# Patient Record
Sex: MALE | Race: BLACK OR AFRICAN AMERICAN | Employment: PART TIME | URBAN - METROPOLITAN AREA
[De-identification: names, ages, dates, MRNs, and addresses within clinical notes are randomized per-mention and may not be internally consistent; named-entity substitution may affect disease eponyms.]

---

## 2017-08-08 ENCOUNTER — OFFICE VISIT (OUTPATIENT)
Dept: FAMILY MEDICINE CLINIC | Age: 47
End: 2017-08-08

## 2017-08-08 VITALS
HEART RATE: 75 BPM | BODY MASS INDEX: 29.47 KG/M2 | OXYGEN SATURATION: 98 % | HEIGHT: 69 IN | TEMPERATURE: 98 F | WEIGHT: 199 LBS | SYSTOLIC BLOOD PRESSURE: 120 MMHG | RESPIRATION RATE: 16 BRPM | DIASTOLIC BLOOD PRESSURE: 77 MMHG

## 2017-08-08 DIAGNOSIS — M25.511 RIGHT SHOULDER PAIN, UNSPECIFIED CHRONICITY: Primary | ICD-10-CM

## 2017-08-08 NOTE — PROGRESS NOTES
I have reviewed discharge instructions with the patient. Patient given and reviewed  [facility] financial aide screening process. The instructions  Were included in  his AVS .    Patient  told to call [facility] as soon as he is accepted into the [facility] program so that a referral can be placed. Patient given AVS       The patient verbalized understanding.

## 2017-08-08 NOTE — PROGRESS NOTES
HISTORY OF PRESENT ILLNESS  Pepito Mireles is a 56 y.o. male. Shoulder Pain    The history is provided by the patient. Incident onset: Presents with cc of R shoulder pain. Symptoms worsening over the last 2-3 weeks. No antecedent injury or h/o dislocation. Pain is constant and both posterior and anterior. Aggravated by leaning forward. No relieving factors. Incident location: Notes some tingling in arm when pain is worst.   There was no injury mechanism. The right shoulder is affected. The pain is moderate. There is no history of shoulder injury. There is no history of shoulder surgery. Review of Systems   Constitutional: Negative. Physical Exam   Constitutional: He appears well-developed and well-nourished. HENT:   Head: Normocephalic and atraumatic. Musculoskeletal:   R shoulder: TTP over posterior shoulder and along anterior deltoid. No bony deformity. ABD:  180 with pain  FF:  180 w/o pain  Ext rot: 70 (90 on L side)  Int rot: L2 (T 10 on L)  Strength 5/5. Negative impingement sign  + Apprehension sign       ASSESSMENT and PLAN  R shoulder pain: He has some instability on exam.  Some concern for a labral tear. Will start screening process to be seen at [facility].

## 2017-08-08 NOTE — MR AVS SNAPSHOT
Visit Information Date & Time Provider Department Dept. Phone Encounter #  
 8/8/2017  2:00 PM Kalie Rubio MD [id number] [id number] Upcoming Health Maintenance Date Due Pneumococcal 19-64 Medium Risk (1 of 1 - PPSV23) 4/1/1990 DTaP/Tdap/Td series (1 - Tdap) 4/1/1992 INFLUENZA AGE 9 TO ADULT 8/1/2017 Allergies as of 8/8/2017  Review Complete On: 8/8/2017 By: Kalie Rubio MD  
  
 Severity Noted Reaction Type Reactions Keflex [Cephalexin]  09/01/2015   Side Effect Hives Current Immunizations  Never Reviewed No immunizations on file. Not reviewed this visit You Were Diagnosed With   
  
 Codes Comments Right shoulder pain, unspecified chronicity    -  Primary ICD-10-CM: M25.511 ICD-9-CM: 719.41 Vitals BP Pulse Temp Resp Height(growth percentile) Weight(growth percentile) 120/77 (BP 1 Location: Left arm, BP Patient Position: Sitting) 75 98 °F (36.7 °C) (Oral) 16 5' 9\" (1.753 m) 199 lb (90.3 kg) SpO2 BMI Smoking Status 98% 29.39 kg/m2 Current Every Day Smoker Vitals History BMI and BSA Data Body Mass Index Body Surface Area  
 29.39 kg/m 2 2.1 m 2 Preferred Pharmacy Pharmacy Name Phone [facility] [address] [facility], [address] [place] [address] [phone] Your Updated Medication List  
  
   
This list is accurate as of: 8/8/17  2:19 PM.  Always use your most recent med list.  
  
  
  
  
 cyclobenzaprine 10 mg tablet Commonly known as:  FLEXERIL  
TAKE ONE TABLET BY MOUTH THREE TIMES DAILY AS NEEDED FOR MUSCLE SPASM(S)  
  
 naproxen 500 mg tablet Commonly known as:  NAPROSYN Take 1 Tab by mouth two (2) times daily (with meals). raNITIdine 150 mg tablet Commonly known as:  ZANTAC Take 150 mg by mouth two (2) times a day. Patient Instructions You have been referred for specialty care at  [facility] in [place]. Services are provided at a sliding scale rate based on your income. Please call [phone] or [phone] to start the screening process. You can leave them a message with your information, and you will receive a call back. They will only try to contact you two times, so if you miss the call or have not heard from them 2 weeks after your call, please call and leave another message. Once you receive your letter of acceptance, bring it to the [place] [address] and we can schedule your appointment. Introducing [facility]! Kristina Esposito introduces [employer] patient portal. Now you can access parts of your medical record, email your doctor's office, and request medication refills online. 1. In your internet browser, go to [email or web address] 2. Click on the First Time User? Click Here link in the Sign In box. You will see the New Member Sign Up page. 3. Enter your [employer] Access Code exactly as it appears below. You will not need to use this code after youve completed the sign-up process. If you do not sign up before the expiration date, you must request a new code. · [employer] Access Code: [email or web address] Expires: 11/6/2017  2:19 PM 
 
4. Enter the last four digits of your Social Security Number (xxxx) and Date of Birth (mm/dd/yyyy) as indicated and click Submit. You will be taken to the next sign-up page. 5. Create a [employer] ID. This will be your [employer] login ID and cannot be changed, so think of one that is secure and easy to remember. 6. Create a [employer] password. You can change your password at any time. 7. Enter your Password Reset Question and Answer. This can be used at a later time if you forget your password. 8. Enter your e-mail address. You will receive e-mail notification when new information is available in [address] 9. Click Sign Up. You can now view and download portions of your medical record. 10. Click the Download Summary menu link to download a portable copy of your medical information. If you have questions, please visit the Frequently Asked Questions section of the [employer] website. Remember, [employer] is NOT to be used for urgent needs. For medical emergencies, dial 911. Now available from your iPhone and Android! Please provide this summary of care documentation to your next provider. Your primary care clinician is listed as Micah Alvarado. If you have any questions after today's visit, please call [phone].

## 2017-08-08 NOTE — PATIENT INSTRUCTIONS
You have been referred for specialty care at  [address] [place] [address] in [place]. Services are provided at a sliding scale rate based on your income. Please call [phone] or [phone] to start the screening process. You can leave them a message with your information, and you will receive a call back. They will only try to contact you two times, so if you miss the call or have not heard from them 2 weeks after your call, please call and leave another message. Once you receive your letter of acceptance, bring it to the [place] [address] and we can schedule your appointment.

## 2017-08-16 ENCOUNTER — TELEPHONE (OUTPATIENT)
Dept: FAMILY MEDICINE CLINIC | Age: 47
End: 2017-08-16

## 2017-08-16 NOTE — TELEPHONE ENCOUNTER
Patient called and left message for robaxin refill, I see he hasnt been on it in a while please advise.

## 2017-08-22 RX ORDER — METHOCARBAMOL 750 MG/1
750 TABLET, FILM COATED ORAL 3 TIMES DAILY
Qty: 60 TAB | Refills: 3 | Status: SHIPPED | OUTPATIENT
Start: 2017-08-22 | End: 2018-08-28

## 2018-08-28 ENCOUNTER — HOSPITAL ENCOUNTER (OUTPATIENT)
Dept: LAB | Age: 48
Discharge: HOME | End: 2018-08-28

## 2018-08-28 ENCOUNTER — OFFICE VISIT (OUTPATIENT)
Dept: FAMILY MEDICINE CLINIC | Age: 48
End: 2018-08-28

## 2018-08-28 VITALS
WEIGHT: 196 LBS | SYSTOLIC BLOOD PRESSURE: 111 MMHG | DIASTOLIC BLOOD PRESSURE: 73 MMHG | BODY MASS INDEX: 29.03 KG/M2 | OXYGEN SATURATION: 98 % | HEART RATE: 98 BPM | HEIGHT: 69 IN | TEMPERATURE: 97.3 F

## 2018-08-28 DIAGNOSIS — M54.50 CHRONIC LEFT-SIDED LOW BACK PAIN WITHOUT SCIATICA: Primary | ICD-10-CM

## 2018-08-28 DIAGNOSIS — G89.29 CHRONIC LEFT-SIDED LOW BACK PAIN WITHOUT SCIATICA: Primary | ICD-10-CM

## 2018-08-28 LAB
ALBUMIN SERPL-MCNC: 4.4 G/DL (ref 3.4–5)
ALBUMIN/GLOB SERPL: 1.2 {RATIO} (ref 0.8–1.7)
ALP SERPL-CCNC: 114 U/L (ref 45–117)
ALT SERPL-CCNC: 37 U/L (ref 16–61)
ANION GAP SERPL CALC-SCNC: 8 MMOL/L (ref 3–18)
AST SERPL-CCNC: 21 U/L (ref 15–37)
BASOPHILS # BLD: 0.1 K/UL (ref 0–0.1)
BASOPHILS NFR BLD: 1 % (ref 0–2)
BILIRUB SERPL-MCNC: 0.5 MG/DL (ref 0.2–1)
BUN SERPL-MCNC: 8 MG/DL (ref 7–18)
BUN/CREAT SERPL: 7 (ref 12–20)
CALCIUM SERPL-MCNC: 9.2 MG/DL (ref 8.5–10.1)
CHLORIDE SERPL-SCNC: 105 MMOL/L (ref 100–108)
CHOLEST SERPL-MCNC: 217 MG/DL
CO2 SERPL-SCNC: 27 MMOL/L (ref 21–32)
CREAT SERPL-MCNC: 1.14 MG/DL (ref 0.6–1.3)
DIFFERENTIAL METHOD BLD: NORMAL
EOSINOPHIL # BLD: 0.1 K/UL (ref 0–0.4)
EOSINOPHIL NFR BLD: 2 % (ref 0–5)
ERYTHROCYTE [DISTWIDTH] IN BLOOD BY AUTOMATED COUNT: 13.1 % (ref 11.6–14.5)
EST. AVERAGE GLUCOSE BLD GHB EST-MCNC: 117 MG/DL
GLOBULIN SER CALC-MCNC: 3.8 G/DL (ref 2–4)
GLUCOSE SERPL-MCNC: 104 MG/DL (ref 74–99)
HBA1C MFR BLD: 5.7 % (ref 4.2–5.6)
HCT VFR BLD AUTO: 44 % (ref 36–48)
HDLC SERPL-MCNC: 46 MG/DL (ref 40–60)
HDLC SERPL: 4.7 {RATIO} (ref 0–5)
HGB BLD-MCNC: 14.8 G/DL (ref 13–16)
LDLC SERPL CALC-MCNC: 140.4 MG/DL (ref 0–100)
LIPID PROFILE,FLP: ABNORMAL
LYMPHOCYTES # BLD: 2.7 K/UL (ref 0.9–3.6)
LYMPHOCYTES NFR BLD: 49 % (ref 21–52)
MCH RBC QN AUTO: 31.2 PG (ref 24–34)
MCHC RBC AUTO-ENTMCNC: 33.6 G/DL (ref 31–37)
MCV RBC AUTO: 92.6 FL (ref 74–97)
MONOCYTES # BLD: 0.3 K/UL (ref 0.05–1.2)
MONOCYTES NFR BLD: 5 % (ref 3–10)
NEUTS SEG # BLD: 2.3 K/UL (ref 1.8–8)
NEUTS SEG NFR BLD: 43 % (ref 40–73)
PLATELET # BLD AUTO: 228 K/UL (ref 135–420)
PMV BLD AUTO: 11.8 FL (ref 9.2–11.8)
POTASSIUM SERPL-SCNC: 4 MMOL/L (ref 3.5–5.5)
PROT SERPL-MCNC: 8.2 G/DL (ref 6.4–8.2)
RBC # BLD AUTO: 4.75 M/UL (ref 4.7–5.5)
SODIUM SERPL-SCNC: 140 MMOL/L (ref 136–145)
TRIGL SERPL-MCNC: 153 MG/DL (ref ?–150)
TSH SERPL DL<=0.05 MIU/L-ACNC: 1.1 UIU/ML (ref 0.36–3.74)
VLDLC SERPL CALC-MCNC: 30.6 MG/DL
WBC # BLD AUTO: 5.4 K/UL (ref 4.6–13.2)

## 2018-08-28 PROCEDURE — 84443 ASSAY THYROID STIM HORMONE: CPT | Performed by: FAMILY MEDICINE

## 2018-08-28 PROCEDURE — 85025 COMPLETE CBC W/AUTO DIFF WBC: CPT | Performed by: FAMILY MEDICINE

## 2018-08-28 PROCEDURE — 80061 LIPID PANEL: CPT | Performed by: FAMILY MEDICINE

## 2018-08-28 PROCEDURE — 80053 COMPREHEN METABOLIC PANEL: CPT | Performed by: FAMILY MEDICINE

## 2018-08-28 PROCEDURE — 83036 HEMOGLOBIN GLYCOSYLATED A1C: CPT | Performed by: FAMILY MEDICINE

## 2018-08-28 RX ORDER — NAPROXEN 500 MG/1
500 TABLET ORAL 2 TIMES DAILY WITH MEALS
Qty: 60 TAB | Refills: 2 | Status: SHIPPED | OUTPATIENT
Start: 2018-08-28 | End: 2019-03-26 | Stop reason: SDUPTHER

## 2018-08-28 RX ORDER — CYCLOBENZAPRINE HCL 10 MG
10 TABLET ORAL
Qty: 30 TAB | Refills: 1 | Status: SHIPPED | OUTPATIENT
Start: 2018-08-28 | End: 2019-03-26 | Stop reason: SDUPTHER

## 2018-08-28 NOTE — PATIENT INSTRUCTIONS

## 2018-08-28 NOTE — PROGRESS NOTES
You have been referred for specialty care at  [address] [place] [address] in [place]. Services are provided at a sliding scale rate based on your income. Please call [phone] or [phone] to start the screening process. You can leave them a message with your information, and you will receive a call back. They will only try to contact you two times, so if you miss the call or have not heard from them 2 weeks after your call, please call and leave another message. Once you receive your letter of acceptance, bring it to the [place] [address] and we can schedule your appointment.

## 2018-08-29 NOTE — PROGRESS NOTES
HPI  Jordan Lopez is a 53 y.o. male being seen today for   Chief Complaint   Patient presents with    Follow-up     back   . he states that he has left lower back pain for years. In the past had some relief from flexeril and naproxen but it never really went away. Sometimes radiates into buttock. Referred to ortho last visit for shoulder but has not started financial screening process. Past Medical History:   Diagnosis Date    Arthritis 2013    left knee    GERD (gastroesophageal reflux disease) 2012         ROS  Patient states that he is feeling well. Denies complaints of chest pain, shortness of breath, swelling of legs, dizziness or weakness. he denies nausea, vomiting or diarrhea. Current Outpatient Prescriptions   Medication Sig    naproxen (NAPROSYN) 500 mg tablet Take 1 Tab by mouth two (2) times daily (with meals).  cyclobenzaprine (FLEXERIL) 10 mg tablet Take 1 Tab by mouth three (3) times daily as needed for Muscle Spasm(s).  ranitidine (ZANTAC) 150 mg tablet Take 150 mg by mouth two (2) times a day. No current facility-administered medications for this visit. PE  Visit Vitals    /73 (BP 1 Location: Left arm, BP Patient Position: Sitting)    Pulse 98    Temp 97.3 °F (36.3 °C) (Temporal)    Ht 5' 9\" (1.753 m)    Wt 196 lb (88.9 kg)    SpO2 98%    BMI 28.94 kg/m2        Alert and oriented with normal mood and affect. he is well developed and well nourished . Lungs are clear without wheezing. Heart rate is regular without murmurs or gallops. There is no lower extremity edema.      Results for orders placed or performed in visit on 09/08/15   AMB POC URINALYSIS DIP STICK AUTO W/O MICRO   Result Value Ref Range    Color (UA POC) Yellow     Clarity (UA POC) Clear     Glucose (UA POC) Negative Negative    Bilirubin (UA POC) Negative Negative    Ketones (UA POC) Negative Negative    Specific gravity (UA POC) 1.015 1.001 - 1.035    Blood (UA POC) Negative Negative    pH (UA POC) 6.5 4.6 - 8.0    Protein (UA POC) Negative Negative mg/dL    Urobilinogen (UA POC) 0.2 mg/dL 0.2 - 1    Nitrites (UA POC) Negative Negative    Leukocyte esterase (UA POC) Negative Negative   AMB POC GLUCOSE BLOOD, BY GLUCOSE MONITORING DEVICE   Result Value Ref Range    Glucose  mg/dL         Assessment and Plan:        ICD-10-CM ICD-9-CM    1.  Chronic left-sided low back pain without sciatica M54.5 724.2     G89.29 338.29      Start HEP for back  Refill flexeril and naproxen  If not better consider formal PT referral.      Taurus Buck MD

## 2019-03-26 ENCOUNTER — OFFICE VISIT (OUTPATIENT)
Dept: FAMILY MEDICINE CLINIC | Age: 49
End: 2019-03-26

## 2019-03-26 VITALS
HEIGHT: 69 IN | DIASTOLIC BLOOD PRESSURE: 76 MMHG | SYSTOLIC BLOOD PRESSURE: 128 MMHG | BODY MASS INDEX: 29.77 KG/M2 | RESPIRATION RATE: 17 BRPM | TEMPERATURE: 98.1 F | WEIGHT: 201 LBS | OXYGEN SATURATION: 96 % | HEART RATE: 95 BPM

## 2019-03-26 DIAGNOSIS — Z12.11 SCREENING FOR COLON CANCER: ICD-10-CM

## 2019-03-26 DIAGNOSIS — Z00.00 ROUTINE CHECK-UP: Primary | ICD-10-CM

## 2019-03-26 DIAGNOSIS — G89.29 CHRONIC LEFT-SIDED LOW BACK PAIN WITHOUT SCIATICA: ICD-10-CM

## 2019-03-26 DIAGNOSIS — M54.50 CHRONIC LEFT-SIDED LOW BACK PAIN WITHOUT SCIATICA: ICD-10-CM

## 2019-03-26 LAB
BILIRUB UR QL STRIP: NEGATIVE
GLUCOSE UR-MCNC: NEGATIVE MG/DL
KETONES P FAST UR STRIP-MCNC: NEGATIVE MG/DL
PH UR STRIP: 5.5 [PH] (ref 4.6–8)
PROT UR QL STRIP: NEGATIVE
SP GR UR STRIP: 1 (ref 1–1.03)
UA UROBILINOGEN AMB POC: NORMAL (ref 0.2–1)
URINALYSIS CLARITY POC: CLEAR
URINALYSIS COLOR POC: NORMAL
URINE BLOOD POC: NEGATIVE
URINE LEUKOCYTES POC: NEGATIVE
URINE NITRITES POC: NEGATIVE

## 2019-03-26 RX ORDER — CYCLOBENZAPRINE HCL 10 MG
10 TABLET ORAL
Qty: 30 TAB | Refills: 1 | Status: SHIPPED | OUTPATIENT
Start: 2019-03-26 | End: 2021-02-04

## 2019-03-26 RX ORDER — NAPROXEN 500 MG/1
500 TABLET ORAL 2 TIMES DAILY WITH MEALS
Qty: 60 TAB | Refills: 2 | Status: SHIPPED | OUTPATIENT
Start: 2019-03-26 | End: 2021-02-04

## 2019-03-26 NOTE — PROGRESS NOTES
HPI 
Charles Moon is a 53 y.o. male being seen today for Chief Complaint Patient presents with  Back Pain Salina Spencer followup for this pt with back pain. he states that he is out of flexeril and naproxen. They help a small amount with back pain but not much He did back exercises we gave him but they did not help much either. Has not applied for medicaid and he thinks he should qualify Past Medical History:  
Diagnosis Date  Arthritis 2013  
 left knee  GERD (gastroesophageal reflux disease) 2012 ROS Patient states that he is feeling well. Denies complaints of chest pain, shortness of breath, swelling of legs, dizziness or weakness. he denies nausea, vomiting or diarrhea. Current Outpatient Medications Medication Sig  cyclobenzaprine (FLEXERIL) 10 mg tablet Take 1 Tab by mouth three (3) times daily as needed for Muscle Spasm(s).  naproxen (NAPROSYN) 500 mg tablet Take 1 Tab by mouth two (2) times daily (with meals).  ranitidine (ZANTAC) 150 mg tablet Take 150 mg by mouth two (2) times a day. No current facility-administered medications for this visit. PE Visit Vitals /76 (BP 1 Location: Left arm, BP Patient Position: Sitting) Pulse 95 Temp 98.1 °F (36.7 °C) (Temporal) Resp 17 Ht 5' 9\" (1.753 m) Wt 201 lb (91.2 kg) SpO2 96% BMI 29.68 kg/m² Alert and oriented with normal mood and affect. he is well developed and well nourished . Assessment and Plan: ICD-10-CM ICD-9-CM 1. Routine check-up Z00.00 V70.0 AMB POC URINALYSIS DIP STICK AUTO W/O MICRO 2. Screening for colon cancer Z12.11 V76.51 OCCULT BLOOD IMMUNOASSAY,DIAGNOSTIC 3. Chronic left-sided low back pain without sciatica M54.5 724.2 G89.29 338.29 Refill naproxen and flexeril Apply medicaid 
followup 2 mos and refer to PT for chronic LBP when medicaid active My Wright MD

## 2019-03-26 NOTE — PROGRESS NOTES
1. Have you been to the ER, urgent care clinic since your last visit? Hospitalized since your last visit? No 
 
2. Have you seen or consulted any other health care providers outside of the [address] [place] [address] since your last visit? Include any pap smears or colon screening.  No

## 2019-03-26 NOTE — PATIENT INSTRUCTIONS
As many of you know, [place] is expanding Medicaid in January of 2019. Many residents who applied and were denied in the past will now qualify for Medicaid. Under the new system, qualification for non-disabled, non-pregnant adults will be based on income level. The income cut off is listed below, but the only way to know if you may qualify is to apply. You can submit an application online at [email or web address].[place].gov, in person at , or by phone at [phone] Income cut off Single person                                     $16.754  or less per year       ($1,397 or less per month) Family of two                                     $22,715 or less per year         ($1,894 or less per month) Family of three                                   $28.677 or less per year         ($2.391 or less per month) Family of four                                     $34,638 or less per year         ($2,887 or less per month)

## 2019-03-27 ENCOUNTER — HOSPITAL ENCOUNTER (OUTPATIENT)
Dept: LAB | Age: 49
Discharge: HOME | End: 2019-03-27

## 2019-03-27 DIAGNOSIS — Z12.11 SCREENING FOR COLON CANCER: ICD-10-CM

## 2019-03-27 PROCEDURE — 82274 ASSAY TEST FOR BLOOD FECAL: CPT

## 2019-04-02 LAB — HEMOCCULT STL QL IA: NEGATIVE

## 2021-02-04 ENCOUNTER — HOSPITAL ENCOUNTER (INPATIENT)
Age: 51
LOS: 1 days | Discharge: HOME | DRG: 816 | End: 2021-02-05
Attending: EMERGENCY MEDICINE | Admitting: HOSPITALIST

## 2021-02-04 ENCOUNTER — APPOINTMENT (OUTPATIENT)
Dept: CT IMAGING | Age: 51
DRG: 816 | End: 2021-02-04
Attending: EMERGENCY MEDICINE

## 2021-02-04 DIAGNOSIS — R59.1 LYMPHADENOPATHY OF HEAD AND NECK: Primary | ICD-10-CM

## 2021-02-04 PROBLEM — R22.1 NECK MASS: Status: ACTIVE | Noted: 2021-02-04

## 2021-02-04 PROBLEM — K21.9 GERD (GASTROESOPHAGEAL REFLUX DISEASE): Status: ACTIVE | Noted: 2021-02-04

## 2021-02-04 PROBLEM — F17.200 SMOKER: Status: ACTIVE | Noted: 2021-02-04

## 2021-02-04 LAB
ANION GAP SERPL CALC-SCNC: 5 MMOL/L (ref 3–18)
BASOPHILS # BLD: 0.1 K/UL (ref 0–0.1)
BASOPHILS NFR BLD: 1 % (ref 0–2)
BUN SERPL-MCNC: 6 MG/DL (ref 7–18)
BUN/CREAT SERPL: 5 (ref 12–20)
CALCIUM SERPL-MCNC: 9.3 MG/DL (ref 8.5–10.1)
CHLORIDE SERPL-SCNC: 108 MMOL/L (ref 100–111)
CO2 SERPL-SCNC: 27 MMOL/L (ref 21–32)
CREAT SERPL-MCNC: 1.19 MG/DL (ref 0.6–1.3)
DIFFERENTIAL METHOD BLD: ABNORMAL
EOSINOPHIL # BLD: 0.2 K/UL (ref 0–0.4)
EOSINOPHIL NFR BLD: 3 % (ref 0–5)
ERYTHROCYTE [DISTWIDTH] IN BLOOD BY AUTOMATED COUNT: 12.4 % (ref 11.6–14.5)
GLUCOSE SERPL-MCNC: 112 MG/DL (ref 74–99)
HCT VFR BLD AUTO: 44.2 % (ref 36–48)
HGB BLD-MCNC: 15.3 G/DL (ref 13–16)
LDH SERPL L TO P-CCNC: 197 U/L (ref 81–234)
LYMPHOCYTES # BLD: 2.5 K/UL (ref 0.9–3.6)
LYMPHOCYTES NFR BLD: 52 % (ref 21–52)
MCH RBC QN AUTO: 32 PG (ref 24–34)
MCHC RBC AUTO-ENTMCNC: 34.6 G/DL (ref 31–37)
MCV RBC AUTO: 92.5 FL (ref 74–97)
MONOCYTES # BLD: 0.4 K/UL (ref 0.05–1.2)
MONOCYTES NFR BLD: 8 % (ref 3–10)
NEUTS SEG # BLD: 1.7 K/UL (ref 1.8–8)
NEUTS SEG NFR BLD: 36 % (ref 40–73)
PLATELET # BLD AUTO: 242 K/UL (ref 135–420)
PMV BLD AUTO: 10.6 FL (ref 9.2–11.8)
POTASSIUM SERPL-SCNC: 3.8 MMOL/L (ref 3.5–5.5)
RBC # BLD AUTO: 4.78 M/UL (ref 4.7–5.5)
SODIUM SERPL-SCNC: 140 MMOL/L (ref 136–145)
WBC # BLD AUTO: 4.8 K/UL (ref 4.6–13.2)

## 2021-02-04 PROCEDURE — 82784 ASSAY IGA/IGD/IGG/IGM EACH: CPT

## 2021-02-04 PROCEDURE — 99285 EMERGENCY DEPT VISIT HI MDM: CPT

## 2021-02-04 PROCEDURE — 74011250637 HC RX REV CODE- 250/637: Performed by: HOSPITALIST

## 2021-02-04 PROCEDURE — 84155 ASSAY OF PROTEIN SERUM: CPT

## 2021-02-04 PROCEDURE — 74011250636 HC RX REV CODE- 250/636: Performed by: HOSPITALIST

## 2021-02-04 PROCEDURE — 70491 CT SOFT TISSUE NECK W/DYE: CPT

## 2021-02-04 PROCEDURE — 65270000029 HC RM PRIVATE

## 2021-02-04 PROCEDURE — 74011000636 HC RX REV CODE- 636: Performed by: EMERGENCY MEDICINE

## 2021-02-04 PROCEDURE — 85025 COMPLETE CBC W/AUTO DIFF WBC: CPT

## 2021-02-04 PROCEDURE — 80048 BASIC METABOLIC PNL TOTAL CA: CPT

## 2021-02-04 PROCEDURE — 83615 LACTATE (LD) (LDH) ENZYME: CPT

## 2021-02-04 RX ORDER — SODIUM CHLORIDE 0.9 % (FLUSH) 0.9 %
5-40 SYRINGE (ML) INJECTION EVERY 8 HOURS
Status: DISCONTINUED | OUTPATIENT
Start: 2021-02-04 | End: 2021-02-05 | Stop reason: HOSPADM

## 2021-02-04 RX ORDER — ACETAMINOPHEN 500 MG
500 TABLET ORAL
COMMUNITY

## 2021-02-04 RX ORDER — FACIAL-BODY WIPES
10 EACH TOPICAL DAILY PRN
Status: DISCONTINUED | OUTPATIENT
Start: 2021-02-04 | End: 2021-02-05 | Stop reason: HOSPADM

## 2021-02-04 RX ORDER — SODIUM CHLORIDE 0.9 % (FLUSH) 0.9 %
5-40 SYRINGE (ML) INJECTION AS NEEDED
Status: DISCONTINUED | OUTPATIENT
Start: 2021-02-04 | End: 2021-02-05 | Stop reason: HOSPADM

## 2021-02-04 RX ORDER — HYDROGEN PEROXIDE 3 %
20 SOLUTION, NON-ORAL MISCELLANEOUS DAILY
COMMUNITY

## 2021-02-04 RX ORDER — ACETAMINOPHEN 325 MG/1
650 TABLET ORAL
Status: DISCONTINUED | OUTPATIENT
Start: 2021-02-04 | End: 2021-02-05 | Stop reason: HOSPADM

## 2021-02-04 RX ORDER — ENOXAPARIN SODIUM 100 MG/ML
40 INJECTION SUBCUTANEOUS DAILY
Status: DISCONTINUED | OUTPATIENT
Start: 2021-02-05 | End: 2021-02-05 | Stop reason: HOSPADM

## 2021-02-04 RX ORDER — PROMETHAZINE HYDROCHLORIDE 25 MG/1
12.5 TABLET ORAL
Status: DISCONTINUED | OUTPATIENT
Start: 2021-02-04 | End: 2021-02-05 | Stop reason: HOSPADM

## 2021-02-04 RX ORDER — IBUPROFEN 200 MG
1 TABLET ORAL DAILY
Status: DISCONTINUED | OUTPATIENT
Start: 2021-02-05 | End: 2021-02-05 | Stop reason: HOSPADM

## 2021-02-04 RX ORDER — ACETAMINOPHEN 650 MG/1
650 SUPPOSITORY RECTAL
Status: DISCONTINUED | OUTPATIENT
Start: 2021-02-04 | End: 2021-02-05 | Stop reason: HOSPADM

## 2021-02-04 RX ORDER — ONDANSETRON 2 MG/ML
4 INJECTION INTRAMUSCULAR; INTRAVENOUS
Status: DISCONTINUED | OUTPATIENT
Start: 2021-02-04 | End: 2021-02-05 | Stop reason: HOSPADM

## 2021-02-04 RX ORDER — FAMOTIDINE 20 MG/1
20 TABLET, FILM COATED ORAL 2 TIMES DAILY
Status: DISCONTINUED | OUTPATIENT
Start: 2021-02-04 | End: 2021-02-05 | Stop reason: HOSPADM

## 2021-02-04 RX ORDER — SODIUM CHLORIDE 9 MG/ML
75 INJECTION, SOLUTION INTRAVENOUS CONTINUOUS
Status: DISPENSED | OUTPATIENT
Start: 2021-02-04 | End: 2021-02-05

## 2021-02-04 RX ADMIN — SODIUM CHLORIDE 75 ML/HR: 900 INJECTION, SOLUTION INTRAVENOUS at 17:48

## 2021-02-04 RX ADMIN — FAMOTIDINE 20 MG: 20 TABLET, FILM COATED ORAL at 20:40

## 2021-02-04 RX ADMIN — IOPAMIDOL 100 ML: 612 INJECTION, SOLUTION INTRAVENOUS at 16:02

## 2021-02-04 NOTE — H&P
History & Physical    Patient: Baljinder Marroquin MRN: [id number]  CSN: [id number]    YOB: 1970  Age: 53 y.o. Sex: male      DOA: 2/4/2021  Primary Care Provider:  [facility]  Date Reviewed: 8/8/2017          Codes Class Noted POA    * (Principal) Lymphadenopathy ICD-10-CM: R59.1  ICD-9-CM: 785.6  2/4/2021 Unknown        GERD (gastroesophageal reflux disease) ICD-10-CM: K21.9  ICD-9-CM: 530.81  2/4/2021 Unknown        Smoker ICD-10-CM: F17.200  ICD-9-CM: 305.1  2/4/2021 Unknown        Neck mass ICD-10-CM: R22.1  ICD-9-CM: 784.2  2/4/2021 Unknown              Admit to medical     Neck pain/mass, lymphadenopathy  We will have CT chest/abdomen pelvic  Oncology was consulted  Biopsy per surgery  Tylenol as needed for pain    GERD  PPI    Smoker  Nicotine patch    Estimate  length of stay : 1-2 days  DVT : Lovenox ppi proph  CC: Neck pain       HPI:     Baljinder Marroquin is a 53 y.o. male with smoker, reflux disease, chronic back pain came to ER due to neck pain  for 1 week. He noted on lump on left side of neck, painful on palpation. Denies any fever/chills /weight loss/ night sweats. CT neck Multiple homogeneously enhancing enlarged left upper cervical chain lymph nodes. metastatic disease vs lymphoma are suspected. Oncologist recommend ct and biopsy. Denies fhx of cancer. Denies any slurred speech/headache/cp/n/v/blurred vission/d/c/palpitation/gait change/bleeding. He smokes, not drinking alcohol. Wife was at the bedside and they agree with staying in the hospital.     Denies any covid 19 exposure.      .   Visit Vitals  /75   Pulse (!) 111   Temp 97.1 °F (36.2 °C)   Resp 16   Ht 5' 10\" (1.778 m)   Wt 92.5 kg (204 lb)   SpO2 99%   BMI 29.27 kg/m²      O2 Device: Room air      Past Medical History:   Diagnosis Date    Arthritis 2013    left knee    GERD (gastroesophageal reflux disease) 2012       Past Surgical History:   Procedure Laterality Date    HX OTHER SURGICAL  2012    Fistula    HX OTHER SURGICAL      fx mandible repair       Family History   Problem Relation Age of Onset    Diabetes Mother     Hypertension Mother     Heart Disease Father     COPD Father        Social History     Socioeconomic History    Marital status:      Spouse name: Not on file    Number of children: Not on file    Years of education: Not on file    Highest education level: Not on file   Tobacco Use    Smoking status: Current Every Day Smoker     Packs/day: 0.50     Types: Cigarettes    Smokeless tobacco: Never Used   Substance and Sexual Activity    Alcohol use: No     Alcohol/week: 0.0 standard drinks    Drug use: No       Prior to Admission medications    Medication Sig Start Date End Date Taking? Authorizing Provider   esomeprazole (NexIUM) 20 mg capsule Take 20 mg by mouth daily. Yes Other, MD Hans   acetaminophen (Tylenol Extra Strength) 500 mg tablet Take 500 mg by mouth every six (6) hours as needed for Pain. Yes Other, MD Hans       Allergies   Allergen Reactions    Keflex [Cephalexin] Hives       Review of Systems  Gen: No fever, chills, malaise, weight loss/gain. Heent: No headache, rhinorrhea, epistaxis, ear pain, hearing loss, sinus pain, + neck pain and swelling , no sore throat. Heart: No chest pain, palpitations, DEWITT, pnd, or orthopnea. Resp: No cough, hemoptysis, wheezing and shortness of breath. GI: No nausea, vomiting, diarrhea, constipation, melena or hematochezia. : No urinary obstruction, dysuria or hematuria. Derm: No rash, new skin lesion or pruritis. Musc/skeletal: no bone or joint complains. Vasc: No edema, cyanosis or claudication. Endo: No heat/cold intolerance, no polyuria,polydipsia or polyphagia. Neuro: No unilateral weakness, numbness, tingling. No seizures. Heme: No easy bruising or bleeding.           Physical Exam:     Physical Exam:  Visit Vitals  /75   Pulse (!) 111   Temp 97.1 °F (36.2 °C) Resp 16   Ht 5' 10\" (1.778 m)   Wt 92.5 kg (204 lb)   SpO2 99%   BMI 29.27 kg/m²      O2 Device: Room air    Temp (24hrs), Av.1 °F (36.2 °C), Min:97.1 °F (36.2 °C), Max:97.1 °F (36.2 °C)    No intake/output data recorded. No intake/output data recorded. General:  Awake, cooperative, no distress. Head:  Normocephalic, without obvious abnormality, atraumatic. Eyes:  Conjunctivae/corneas clear, sclera anicteric, PERRL, EOMs intact. Nose: Nares normal. No drainage or sinus tenderness. Throat: Lips, mucosa, and tongue normal. .   Neck: Supple, symmetrical, trachea midline, +adenopathy. Palpable, hard non movable mass on left neck        Lungs:   Clear to auscultation bilaterally. Heart:  Regular rate and rhythm, S1, S2 normal, no murmur, click, rub or gallop. Abdomen: Soft, non-tender. Bowel sounds normal. No masses,  No organomegaly. Extremities: Extremities normal, atraumatic, no cyanosis or edema. Pulses: 2+ and symmetric all extremities. Skin: Skin color-pink, texture, turgor normal. No rashes or lesions. Capillary refill normal    Neurologic: CNII-XII intact. No focal motor or sensory deficit.        Labs Reviewed:    BMP:   Lab Results   Component Value Date/Time     2021 02:50 PM    K 3.8 2021 02:50 PM     2021 02:50 PM    CO2 27 2021 02:50 PM    AGAP 5 2021 02:50 PM     (H) 2021 02:50 PM    BUN 6 (L) 2021 02:50 PM    CREA 1.19 2021 02:50 PM    GFRAA >60 2021 02:50 PM    GFRNA >60 2021 02:50 PM     CMP:   Lab Results   Component Value Date/Time     2021 02:50 PM    K 3.8 2021 02:50 PM     2021 02:50 PM    CO2 27 2021 02:50 PM    AGAP 5 2021 02:50 PM     (H) 2021 02:50 PM    BUN 6 (L) 2021 02:50 PM    CREA 1.19 2021 02:50 PM    GFRAA >60 2021 02:50 PM    GFRNA >60 2021 02:50 PM    CA 9.3 2021 02:50 PM     CBC:   Lab Results   Component Value Date/Time    WBC 4.8 02/04/2021 02:50 PM    HGB 15.3 02/04/2021 02:50 PM    HCT 44.2 02/04/2021 02:50 PM     02/04/2021 02:50 PM     All Cardiac Markers in the last 24 hours: No results found for: CPK, CK, CKMMB, CKMB, RCK3, CKMBT, CKNDX, CKND1, MERCEDES, TROPT, TROIQ, JUDITH, TROPT, TNIPOC, BNP, BNPP  Recent Glucose Results:   Lab Results   Component Value Date/Time     (H) 02/04/2021 02:50 PM     ABG: No results found for: PH, PHI, PCO2, PCO2I, PO2, PO2I, HCO3, HCO3I, FIO2, FIO2I  COAGS: No results found for: APTT, PTP, INR, INREXT, INREXT  Liver Panel: No results found for: ALB, CBIL, TBIL, TP, GLOB, AGRAT, ASTPOC, ALTPOC, ALT, AP  Pancreatic Markers: No results found for: AMYLPOCT, AML, LIPPOCT, LPSE    Ct Neck Soft Tissue W Cont    Result Date: 2/4/2021  EXAM:  CT of the neck with intravenous contrast. CLINICAL INDICATION/HISTORY: Left neck pain and swelling COMPARISON: None. . TECHNIQUE: Helical axial CT soft tissue neck performed following nonionic IV contrast administration with coronal and sagittal reconstructions obtained. One or more dose reduction techniques were used on this CT: automated exposure control, adjustment of the mAs and/or kVp according to patient's size, and iterative reconstruction techniques. The specific techniques utilized on this CT exam have been documented in the patient's electronic medical record. Digital imaging and communications and medicine (DICOM) format image data are available to nonaffiliated external healthcare facilities or entities on a secure, media free, reciprocally searchable basis with patient authorization for at least a 12 month period after this study. _______________ FINDINGS:      LYMPH NODES:  There are multiple enlarged homogeneously enhancing lymph nodes in the left upper neck, 2.1 cm left level 2A, 2.1 cm just below the left parotid, 1.7 cm left level IIb and slightly more inferiorly 2 cm left level IIb.  All of these measurements are in greatest transverse dimensions on axial imaging, with abnormal rounded appearance of these lymph nodes. Several small additional subcentimeter bilateral cervical chain lymph nodes. 10 mm diameter right paratracheal lymph node at the thoracic inlet. SUPERFICIAL SOFT TISSUES:  Unremarkable. ORAL CAVITY:  Unremarkable. PHARYNX:  Very mildly enlarged symmetric palatine tonsils with no focal mucosal space nodular lesion. LARYNX:  Unremarkable. PAROTID GLANDS:  Unremarkable. SUBMANDIBULAR GLANDS:  Unremarkable. THYROID GLAND:  Unremarkable. INCLUDED PARANASAL SINUSES:  Mild ethmoid sinus mucosal thickening. Mastoids and middle ear spaces clear. MIDDLE EARS:  Unremarkable. INCLUDED ORBITS:  Unremarkable. INCLUDED BRAIN:  Unremarkable. PULMONARY APICES:  Unremarkable. BONES:  Degenerative cervical spondylosis with no high-grade canal stenosis, no acute or aggressive osseous findings. _______________     1. Multiple homogeneously enhancing enlarged left upper cervical chain lymph nodes, level 2. Differential diagnosis includes both inflammatory versus neoplastic etiology lymph nodes, including metastatic disease and less likely lymphoma. No discrete mucosal lesion but suggest endoscopic exam for occult mucosal lesion. 2. Additional ancillary findings as above.      Procedures/imaging: see electronic medical records for all procedures/Xrays and details which were not copied into this note but were reviewed prior to creation of Claire Milner MD, Internal Medicine     CC: UNKNOWN

## 2021-02-04 NOTE — ED TRIAGE NOTES
Pt to ED for left neck swelling/pain x1wk and left lower back pain, pt denies difficulty swallowing/dental issues, fever/chills, N/V/D, CP/SOB.

## 2021-02-04 NOTE — ED PROVIDER NOTES
EMERGENCY DEPARTMENT HISTORY AND PHYSICAL EXAM    Date: 2/4/2021  Patient Name: Eileen Earl    History of Presenting Illness     Chief Complaint   Patient presents with    Neck Pain    Back Pain         History Provided By: Patient    2:27 PM  Eileen Earl is a 53 y.o. male with PMHX of chronic back pain who presents to the emergency department C/O left-sided neck swelling and pain. Patient reports it started about a week ago. He states is becoming progressively larger and more tender. He denies any fever, cough, congestion, sore throat, difficulty swallowing or breathing, chest pain. He does note he has low back pain but it is unchanged from his baseline. No known sick contacts or recent travel. Pain in this area is worse when he palpates, no relieving factors identified. PCP: UNKNOWN    Current Outpatient Medications   Medication Sig Dispense Refill    esomeprazole (NexIUM) 20 mg capsule Take 20 mg by mouth daily.  acetaminophen (Tylenol Extra Strength) 500 mg tablet Take 500 mg by mouth every six (6) hours as needed for Pain. Past History     Past Medical History:  Past Medical History:   Diagnosis Date    Arthritis 2013    left knee    GERD (gastroesophageal reflux disease) 2012       Past Surgical History:  Past Surgical History:   Procedure Laterality Date    HX OTHER SURGICAL  2012    Fistula    HX OTHER SURGICAL      fx mandible repair       Family History:  Family History   Problem Relation Age of Onset    Diabetes Mother     Hypertension Mother     Heart Disease Father     COPD Father        Social History:  Social History     Tobacco Use    Smoking status: Current Every Day Smoker     Packs/day: 0.50     Types: Cigarettes    Smokeless tobacco: Never Used   Substance Use Topics    Alcohol use: No     Alcohol/week: 0.0 standard drinks    Drug use: No       Allergies:   Allergies   Allergen Reactions    Keflex [Cephalexin] Hives         Review of Systems   Review of Systems   Constitutional: Negative for fever. HENT: Negative for congestion, sore throat, trouble swallowing and voice change. Positive neck mass   Respiratory: Negative for shortness of breath. Cardiovascular: Negative for chest pain. Musculoskeletal: Positive for back pain. All other systems reviewed and are negative.         Physical Exam     Vitals:    02/04/21 1427 02/04/21 1632   BP: 122/84 119/80   Pulse: (!) 111    Resp: 16    Temp: 97.1 °F (36.2 °C)    SpO2: 100% 100%   Weight: 92.5 kg (204 lb)    Height: 5' 10\" (1.778 m)      Physical Exam    Nursing notes and vital signs reviewed    Constitutional: Non toxic appearing, moderate distress  Head: Normocephalic, Atraumatic  ENT: Clear posterior oropharynx without erythema, edema, exudate, uvula midline, just inferior to the angle of the mandible on the left there is a large firm mass, approximately 3 cm in diameter that is tender to palpation and mobile  Eyes: EOMI  Neck: Supple  Cardiovascular: Regular rate and rhythm, no murmurs, rubs, or gallops  Chest: Normal work of breathing and chest excursion bilaterally  Lungs: Clear to ausculation bilaterally  Back: No evidence of trauma or deformity  Extremities: No evidence of trauma or deformity  Skin: Warm and dry, normal cap refill  Neuro: Alert and appropriate, CN intact, normal speech, strength and sensation full and symmetric bilaterally, normal gait, normal coordination  Psychiatric: Normal mood and affect      Diagnostic Study Results     Labs -     Recent Results (from the past 12 hour(s))   CBC WITH AUTOMATED DIFF    Collection Time: 02/04/21  2:50 PM   Result Value Ref Range    WBC 4.8 4.6 - 13.2 K/uL    RBC 4.78 4.70 - 5.50 M/uL    HGB 15.3 13.0 - 16.0 g/dL    HCT 44.2 36.0 - 48.0 %    MCV 92.5 74.0 - 97.0 FL    MCH 32.0 24.0 - 34.0 PG    MCHC 34.6 31.0 - 37.0 g/dL    RDW 12.4 11.6 - 14.5 %    PLATELET 096 812 - 403 K/uL    MPV 10.6 9.2 - 11.8 FL    NEUTROPHILS 36 (L) 40 - 73 % LYMPHOCYTES 52 21 - 52 %    MONOCYTES 8 3 - 10 %    EOSINOPHILS 3 0 - 5 %    BASOPHILS 1 0 - 2 %    ABS. NEUTROPHILS 1.7 (L) 1.8 - 8.0 K/UL    ABS. LYMPHOCYTES 2.5 0.9 - 3.6 K/UL    ABS. MONOCYTES 0.4 0.05 - 1.2 K/UL    ABS. EOSINOPHILS 0.2 0.0 - 0.4 K/UL    ABS. BASOPHILS 0.1 0.0 - 0.1 K/UL    DF AUTOMATED     METABOLIC PANEL, BASIC    Collection Time: 02/04/21  2:50 PM   Result Value Ref Range    Sodium 140 136 - 145 mmol/L    Potassium 3.8 3.5 - 5.5 mmol/L    Chloride 108 100 - 111 mmol/L    CO2 27 21 - 32 mmol/L    Anion gap 5 3.0 - 18 mmol/L    Glucose 112 (H) 74 - 99 mg/dL    BUN 6 (L) 7.0 - 18 MG/DL    Creatinine 1.19 0.6 - 1.3 MG/DL    BUN/Creatinine ratio 5 (L) 12 - 20      GFR est AA >60 >60 ml/min/1.73m2    GFR est non-AA >60 >60 ml/min/1.73m2    Calcium 9.3 8.5 - 10.1 MG/DL       Radiologic Studies -   CT NECK SOFT TISSUE W CONT   Final Result      1. Multiple homogeneously enhancing enlarged left upper cervical chain lymph   nodes, level 2. Differential diagnosis includes both inflammatory versus   neoplastic etiology lymph nodes, including metastatic disease and less likely   lymphoma. No discrete mucosal lesion but suggest endoscopic exam for occult   mucosal lesion. 2. Additional ancillary findings as above. CT Results  (Last 48 hours)               02/04/21 1609  CT NECK SOFT TISSUE W CONT Final result    Impression:      1. Multiple homogeneously enhancing enlarged left upper cervical chain lymph   nodes, level 2. Differential diagnosis includes both inflammatory versus   neoplastic etiology lymph nodes, including metastatic disease and less likely   lymphoma. No discrete mucosal lesion but suggest endoscopic exam for occult   mucosal lesion. 2. Additional ancillary findings as above. Narrative:  EXAM:  CT of the neck with intravenous contrast.       CLINICAL INDICATION/HISTORY: Left neck pain and swelling       COMPARISON: None. .       TECHNIQUE: Helical axial CT soft tissue neck performed following nonionic IV   contrast administration with coronal and sagittal reconstructions obtained. One   or more dose reduction techniques were used on this CT: automated exposure   control, adjustment of the mAs and/or kVp according to patient's size, and   iterative reconstruction techniques. The specific techniques utilized on this CT   exam have been documented in the patient's electronic medical record. Digital   imaging and communications and medicine (DICOM) format image data are available   to nonaffiliated external healthcare facilities or entities on a secure, media   free, reciprocally searchable basis with patient authorization for at least a 12   month period after this study. _______________       FINDINGS:             LYMPH NODES:  There are multiple enlarged homogeneously enhancing lymph   nodes in the left upper neck, 2.1 cm left level 2A, 2.1 cm just below the left   parotid, 1.7 cm left level IIb and slightly more inferiorly 2 cm left level IIb. All of these measurements are in greatest transverse dimensions on axial   imaging, with abnormal rounded appearance of these lymph nodes. Several small   additional subcentimeter bilateral cervical chain lymph nodes. 10 mm diameter   right paratracheal lymph node at the thoracic inlet. SUPERFICIAL SOFT TISSUES:  Unremarkable. ORAL CAVITY:  Unremarkable. PHARYNX:  Very mildly enlarged symmetric palatine tonsils with no focal   mucosal space nodular lesion. LARYNX:  Unremarkable. PAROTID GLANDS:  Unremarkable. SUBMANDIBULAR GLANDS:  Unremarkable. THYROID GLAND:  Unremarkable. INCLUDED PARANASAL SINUSES:  Mild ethmoid sinus mucosal thickening. Mastoids and middle ear spaces clear. MIDDLE EARS:  Unremarkable. INCLUDED ORBITS:  Unremarkable. INCLUDED BRAIN:  Unremarkable.             PULMONARY APICES: Unremarkable. BONES:  Degenerative cervical spondylosis with no high-grade canal   stenosis, no acute or aggressive osseous findings. _______________               CXR Results  (Last 48 hours)    None          Medications given in the ED-  Medications   iopamidoL (ISOVUE 300) 61 % contrast injection 100 mL (100 mL IntraVENous Given 2/4/21 1602)         Medical Decision Making   I am the first provider for this patient. I reviewed the vital signs, available nursing notes, past medical history, past surgical history, family history and social history. Vital Signs-Reviewed the patient's vital signs. Pulse Oximetry Analysis - 100% on room air, not hypoxic     Records Reviewed: Nursing Notes    Provider Notes (Medical Decision Making): Colton Mijares is a 53 y.o. male presenting with swelling on the left side of his neck. Swelling is quite pronounced and CT is concerning for possible malignant process. Discussed with oncology who recommend admission for further imaging and biopsy to determine if this is malignancy and to initiate treatment if needed. Discussed with hospitalist and general surgery for further inpatient management. Patient understands and agrees with this plan. Procedures:  Procedures    ED Course:   CONSULT NOTE:   4:41 PM  Dr. Yanni Gay spoke with Timothy Soto   Specialty: Case Management  Discussed pt's hx, disposition, and available diagnostic and imaging results over the telephone. Reviewed care plans. We will continue to assist patient on outpatient basis with getting insurance and follow-up. CONSULT NOTE:   4:49 PM  Dr. Yanni Gay spoke with Dr. Cayla Avery   Specialty: Oncology  Discussed pt's hx, disposition, and available diagnostic and imaging results over the telephone. Reviewed care plans. CT chest/abd/pelvis, admit for biopsy.  Send LDH, SPEP, quantitiative immunoglobulins    CONSULT NOTE:   5:07 PM  Dr. Yanni Gay spoke with Dr. Carlyn Marroquin   Specialty: Hospitalist  Discussed pt's hx, disposition, and available diagnostic and imaging results over the telephone. Reviewed care plans. Accepts to medical.    CONSULT NOTE:   5:11 PM  Dr. Yanni Gay spoke with Dr. Kerry Chavarria   Specialty: General Surgery  Discussed pt's hx, disposition, and available diagnostic and imaging results over the telephone. Reviewed care plans. Will consult regarding biopsy. Diagnosis and Disposition     Critical Care Time: None    Core Measures:  For Hospitalized Patients:    1. Hospitalization Decision Time:  The decision to hospitalize the patient was made by Dr. Yanni Gay at 4:50 PM on 2/4/2021    2. Aspirin: Aspirin was not given because the patient did not present with a stroke at the time of their Emergency Department evaluation    5:08 PM  Patient is being admitted to the hospital by Dr. Carlyn Marroquin. The results of their tests and reasons for their admission have been discussed with them and/or available family. They convey agreement and understanding for the need to be admitted and for their admission diagnosis. CONDITIONS ON ADMISSION:  Sepsis is not present at the time of admission. Deep Vein Thrombosis is not present at the time of admission. Thrombosis is not present at the time of admission. Urinary Tract Infection is not present at the time of admission. Pneumonia is not present at the time of admission. MRSA is not present at the time of admission. Wound infection is not present at the time of admission. Pressure Ulcer is not present at the time of admission. CLINICAL IMPRESSION:    1. Lymphadenopathy of head and neck      _______________________________      Please note that this dictation was completed with [employer], the [employer] voice recognition software. Quite often unanticipated grammatical, syntax, homophones, and other interpretive errors are inadvertently transcribed by the computer software. Please disregard these errors.   Please excuse any errors that have escaped final proofreading.

## 2021-02-04 NOTE — CONSULTS
Consult Note    Patient: Thelma Lai MRN: [id number]  CSN: [id number]    YOB: 1970  Age: 53 y.o. Sex: male    DOA: 2/4/2021 LOS:  LOS: 0 days        Requesting Physician: ED  Reason for Consultation: Lymhadenopthy/neck mass               HPI:     Thelma Lai is a 53 y.o. male who has been seen for neck mass/lymphadenopathy. Past Medical History:   Diagnosis Date    Arthritis 2013    left knee    GERD (gastroesophageal reflux disease) 2012       Past Surgical History:   Procedure Laterality Date    HX OTHER SURGICAL  2012    Fistula    HX OTHER SURGICAL      fx mandible repair       Family History   Problem Relation Age of Onset    Diabetes Mother     Hypertension Mother     Heart Disease Father     COPD Father        Social History     Socioeconomic History    Marital status:      Spouse name: Not on file    Number of children: Not on file    Years of education: Not on file    Highest education level: Not on file   Tobacco Use    Smoking status: Current Every Day Smoker     Packs/day: 0.50     Types: Cigarettes    Smokeless tobacco: Never Used   Substance and Sexual Activity    Alcohol use: No     Alcohol/week: 0.0 standard drinks    Drug use: No       Prior to Admission medications    Medication Sig Start Date End Date Taking? Authorizing Provider   esomeprazole (NexIUM) 20 mg capsule Take 20 mg by mouth daily. Yes Brenda, MD Hans   acetaminophen (Tylenol Extra Strength) 500 mg tablet Take 500 mg by mouth every six (6) hours as needed for Pain. Yes Brenda, MD Hans       Allergies   Allergen Reactions    Keflex [Cephalexin] Hives       Review of Systems  A comprehensive review of systems was negative except for that written in the History of Present Illness.       Physical Exam:      Visit Vitals  /72   Pulse (!) 111   Temp 97.1 °F (36.2 °C)   Resp 16   Ht 5' 10\" (1.778 m)   Wt 92.5 kg (204 lb)   SpO2 100%   BMI 29.27 kg/m²       Physical Exam:  Pertinent items are noted in HPI. Labs Reviewed: All lab results for the last 24 hours reviewed. Assessment/Plan     Principal Problem:    Lymphadenopathy (2/4/2021)    Active Problems:    GERD (gastroesophageal reflux disease) (2/4/2021)      Smoker (2/4/2021)      Neck mass (2/4/2021)        Given findings and description recommend U/S guided needle biopsy to begin with for diagnosis. May need ENT consult.

## 2021-02-04 NOTE — ED NOTES
1st attempt to call report to receiving RN made. Advised Receiving RN busy tasking at time of call and would return call to receiving RN for report.

## 2021-02-04 NOTE — ED NOTES
TRANSFER - OUT REPORT:    Verbal report given to [address] [facility] [employer]  being transferred to  for routine progression of care       Report consisted of patients Situation, Background, Assessment and   Recommendations(SBAR). Information from the following report(s) SBAR, ED Summary and MAR was reviewed with the receiving nurse. Lines:   Peripheral IV 02/04/21 Left; Anterior Antecubital (Active)   Site Assessment Clean, dry, & intact 02/04/21 1450   Phlebitis Assessment 0 02/04/21 1450   Infiltration Assessment 0 02/04/21 1450   Dressing Status Clean, dry, & intact 02/04/21 1450   Dressing Type Tape;Transparent 02/04/21 1450   Hub Color/Line Status Green;Flushed 02/04/21 1450   Action Taken Blood drawn 02/04/21 1450        Opportunity for questions and clarification was provided.       Patient transported with:   ED Tech

## 2021-02-05 ENCOUNTER — APPOINTMENT (OUTPATIENT)
Dept: GENERAL RADIOLOGY | Age: 51
DRG: 816 | End: 2021-02-05
Attending: INTERNAL MEDICINE

## 2021-02-05 VITALS
RESPIRATION RATE: 18 BRPM | OXYGEN SATURATION: 99 % | BODY MASS INDEX: 29.2 KG/M2 | SYSTOLIC BLOOD PRESSURE: 114 MMHG | HEART RATE: 67 BPM | WEIGHT: 204 LBS | HEIGHT: 70 IN | TEMPERATURE: 98.8 F | DIASTOLIC BLOOD PRESSURE: 75 MMHG

## 2021-02-05 PROBLEM — F10.11 HISTORY OF ALCOHOL ABUSE: Status: ACTIVE | Noted: 2021-02-05

## 2021-02-05 PROBLEM — F19.91 HISTORY OF ILLICIT DRUG USE: Status: ACTIVE | Noted: 2021-02-05

## 2021-02-05 LAB
ANION GAP SERPL CALC-SCNC: 7 MMOL/L (ref 3–18)
BASOPHILS # BLD: 0.1 K/UL (ref 0–0.1)
BASOPHILS NFR BLD: 1 % (ref 0–2)
BUN SERPL-MCNC: 6 MG/DL (ref 7–18)
BUN/CREAT SERPL: 6 (ref 12–20)
CALCIUM SERPL-MCNC: 8.8 MG/DL (ref 8.5–10.1)
CHLORIDE SERPL-SCNC: 107 MMOL/L (ref 100–111)
CO2 SERPL-SCNC: 25 MMOL/L (ref 21–32)
CREAT SERPL-MCNC: 1.06 MG/DL (ref 0.6–1.3)
DIFFERENTIAL METHOD BLD: ABNORMAL
EOSINOPHIL # BLD: 0.2 K/UL (ref 0–0.4)
EOSINOPHIL NFR BLD: 3 % (ref 0–5)
ERYTHROCYTE [DISTWIDTH] IN BLOOD BY AUTOMATED COUNT: 12.5 % (ref 11.6–14.5)
GLUCOSE SERPL-MCNC: 122 MG/DL (ref 74–99)
HCT VFR BLD AUTO: 42.3 % (ref 36–48)
HGB BLD-MCNC: 14.1 G/DL (ref 13–16)
LYMPHOCYTES # BLD: 2.4 K/UL (ref 0.9–3.6)
LYMPHOCYTES NFR BLD: 49 % (ref 21–52)
MAGNESIUM SERPL-MCNC: 2.3 MG/DL (ref 1.6–2.6)
MCH RBC QN AUTO: 31 PG (ref 24–34)
MCHC RBC AUTO-ENTMCNC: 33.3 G/DL (ref 31–37)
MCV RBC AUTO: 93 FL (ref 74–97)
MONOCYTES # BLD: 0.3 K/UL (ref 0.05–1.2)
MONOCYTES NFR BLD: 6 % (ref 3–10)
NEUTS SEG # BLD: 2 K/UL (ref 1.8–8)
NEUTS SEG NFR BLD: 41 % (ref 40–73)
PLATELET # BLD AUTO: 223 K/UL (ref 135–420)
PMV BLD AUTO: 10.5 FL (ref 9.2–11.8)
POTASSIUM SERPL-SCNC: 3.9 MMOL/L (ref 3.5–5.5)
RBC # BLD AUTO: 4.55 M/UL (ref 4.7–5.5)
SODIUM SERPL-SCNC: 139 MMOL/L (ref 136–145)
WBC # BLD AUTO: 5 K/UL (ref 4.6–13.2)

## 2021-02-05 PROCEDURE — 71046 X-RAY EXAM CHEST 2 VIEWS: CPT

## 2021-02-05 PROCEDURE — 74011250637 HC RX REV CODE- 250/637: Performed by: HOSPITALIST

## 2021-02-05 PROCEDURE — 36415 COLL VENOUS BLD VENIPUNCTURE: CPT

## 2021-02-05 PROCEDURE — 80048 BASIC METABOLIC PNL TOTAL CA: CPT

## 2021-02-05 PROCEDURE — 83735 ASSAY OF MAGNESIUM: CPT

## 2021-02-05 PROCEDURE — 85025 COMPLETE CBC W/AUTO DIFF WBC: CPT

## 2021-02-05 PROCEDURE — 83883 ASSAY NEPHELOMETRY NOT SPEC: CPT

## 2021-02-05 RX ORDER — CIPROFLOXACIN 2 MG/ML
400 INJECTION, SOLUTION INTRAVENOUS
Status: DISCONTINUED | OUTPATIENT
Start: 2021-02-05 | End: 2021-02-05 | Stop reason: HOSPADM

## 2021-02-05 RX ORDER — SODIUM CHLORIDE, SODIUM LACTATE, POTASSIUM CHLORIDE, CALCIUM CHLORIDE 600; 310; 30; 20 MG/100ML; MG/100ML; MG/100ML; MG/100ML
125 INJECTION, SOLUTION INTRAVENOUS CONTINUOUS
Status: CANCELLED | OUTPATIENT
Start: 2021-02-05

## 2021-02-05 RX ORDER — SODIUM CHLORIDE 0.9 % (FLUSH) 0.9 %
5-40 SYRINGE (ML) INJECTION AS NEEDED
Status: CANCELLED | OUTPATIENT
Start: 2021-02-05

## 2021-02-05 RX ORDER — SODIUM CHLORIDE 0.9 % (FLUSH) 0.9 %
5-40 SYRINGE (ML) INJECTION EVERY 8 HOURS
Status: CANCELLED | OUTPATIENT
Start: 2021-02-05

## 2021-02-05 RX ADMIN — FAMOTIDINE 20 MG: 20 TABLET, FILM COATED ORAL at 10:19

## 2021-02-05 NOTE — ROUTINE PROCESS
Bedside and Verbal shift change report given to Constanza Mcneal RN (oncoming nurse) by JEIMY Navarro RN (offgoing nurse). Report included the following information SBAR, Kardex, OR Summary, Intake/Output, MAR and Recent Results.

## 2021-02-05 NOTE — PROGRESS NOTES
OR calls the floor to inform that: procedure is cancelled by Dr. Bethany Torres because they cannot perform a pathology test.

## 2021-02-05 NOTE — ROUTINE PROCESS
Bedside and Verbal shift change report given to Coral RN (oncoming nurse) by Chaz Gore RN (offgoing nurse). Report included the following information SBAR, Kardex, Intake/Output and MAR.

## 2021-02-05 NOTE — PROGRESS NOTES
Reason for Admission:   C/o left neck swelling/pain along with left lower back pain                   RUR Score:                     Plan for utilizing home health:   TBD       PCP: First and Last name:  Non listed   Name of Practice:    Are you a current patient: Yes/No:    Approximate date of last visit:    Can you participate in a virtual visit with your PCP:                     Current Advanced Directive/Advance Care Plan: not on chart, pastoral  services available if needed                         Transition of Care Plan:   CM was notified by  while pt was seen in the ED for self pay status and follow up care, cm sent E-Mail to [employer] for medicaid screening and financial application, at this time pt has been admitted to medical unit, admitting diagnosis lymphadenopathy of head and neck, oncology has been consulted, unit cm will follow case.

## 2021-02-05 NOTE — DISCHARGE SUMMARY
Discharge Summary    Young Lozada is a 53 y.o. male with smoker, reflux disease, chronic back pain came to ER due to neck pain  for 1 week. He noted on lump on left side of neck, painful on palpation. Denies any fever/chills /weight loss/ night sweats. CT neck Multiple homogeneously enhancing enlarged left upper cervical chain lymph nodes. metastatic disease vs lymphoma are suspected. Oncologist recommend ct and biopsy. Denies fhx of cancer.      Denies any slurred speech/headache/cp/n/v/blurred vission/d/c/palpitation/gait change/bleeding. He smokes, not drinking alcohol. Wife was at the bedside and they agree with staying in the hospital.      Denies any covid 19 exposure. Oncologist/surgeon were consulted.  He left hospital with [facility]

## 2021-02-05 NOTE — PROGRESS NOTES
Cm spoke with pt via phone conversation to discuss d/c planning, when discharged pt plans to return back home with spouse, pt independent, denies any usage of DME's, pt had been active with [address] but now closed due to covid, cms scheduled follow up with [facility] clinic, pt listed as self pay denies having medical insurance, medicaid screening/application completed,biopsy procedure canceled for today pt likely will remain over weekend.

## 2021-02-05 NOTE — PROGRESS NOTES
0759   Pt is awake, alert, oriented x4. Resting in bed. Pt is NPO for CT abdomen/ pelvis and  Bx. Lungs are clear. Abdomen soft with active BS. Pt has a hard mass on left side of neck below jaw line. 200   RN spoke to Dr Shannon Taylor. Pt is going for Cervical lymph node Bx this afternoon. Pt was made aware. Pt brushed his teeth and did oral care. CHG wipes body wash done. 1030   Consent for Cervical lymph bx  siged by pt and witnessed by RN. [id number]   RN spoke to Dr Shannon Taylor regarding  Cancellation of OR Procedure. As per Dr Shannon Taylor, pathologist unable to run  the specimen that oncologist wanted to be done until next week. Procedure will be done as outpatient. Dr Rich Mcneill was made aware. Ordered regular  Diet. Pt wants to go home. Dr Rich Mcneill stated that pt is not for d/c and needs CT of abdomen. 200  Pt was made aware of OR cancellation and  Option for Outpatient procedure. Pt wants to go home. Dr Rich Mcneill was made aware. Dr Rich Mcneill spoke to pt but pt  refusing to stay. Pt still decided to leave the hospital.  Pt signed out AMA. INT removed. 1420   Pt left AMA ambulatory .

## 2021-02-05 NOTE — PROGRESS NOTES
Hospitalist Progress Note-critical care note     Patient: Danni Asif MRN: [id number]  CSN: [id number]    YOB: 1970  Age: 53 y.o. Sex: male    DOA: 2/4/2021 LOS:  LOS: 1 day            Chief complaint: lymphadenopathy, neck mass, smoker , neck mass     Assessment/Plan         Hospital Problems  Date Reviewed: 8/8/2017          Codes Class Noted POA    History of illicit drug use [address]: Z87.898  ICD-9-CM: 305.90  2/5/2021 Unknown        History of alcohol abuse ICD-10-CM: F10.11  ICD-9-CM: 305.03  2/5/2021 Unknown        * (Principal) Lymphadenopathy ICD-10-CM: R59.1  ICD-9-CM: 785.6  2/4/2021 Yes        GERD (gastroesophageal reflux disease) ICD-10-CM: K21.9  ICD-9-CM: 530.81  2/4/2021 Yes        Smoker ICD-10-CM: F17.200  ICD-9-CM: 305.1  2/4/2021 Yes        Neck mass ICD-10-CM: R22.1  ICD-9-CM: 784.2  2/4/2021 Yes              Neck pain/mass, lymphadenopathy  We will have CT chest/abdomen pelvic this afternoon   Oncology was consulted  Biopsy per surgery-hold today-james can not performed   Tylenol as needed for pain  cxr clear      GERD  PPI     Smoker  Nicotine patch       Subjective: feel fine     Disposition :after biopsy   Review of systems:    General: No fevers or chills. Cardiovascular: No chest pain or pressure. No palpitations. Pulmonary: No shortness of breath. Gastrointestinal: No nausea, vomiting. Vital signs/Intake and Output:  Visit Vitals  /75   Pulse 67   Temp 98.8 °F (37.1 °C)   Resp 18   Ht 5' 10\" (1.778 m)   Wt 92.5 kg (204 lb)   SpO2 99%   BMI 29.27 kg/m²     Current Shift:  No intake/output data recorded. Last three shifts:  02/03 1901 - 02/05 0700  In: -   Out: 1 [Urine:475]    Physical Exam:  General: WD, WN. Alert, cooperative, no acute distress    HEENT: NC, Atraumatic. PERRLA, anicteric sclerae. Neck mass same   Lungs: CTA Bilaterally. No Wheezing/Rhonchi/Rales.   Heart:  Regular  rhythm,  No murmur, No Rubs, No Gallops  Abdomen: Soft, Non distended, Non tender. +Bowel sounds,   Extremities: No c/c/e  Psych:   Not anxious or agitated. Neurologic:  No acute neurological deficit. Labs: Results:       Chemistry Recent Labs     02/05/21  0310 02/04/21  1450   * 112*    140   K 3.9 3.8    108   CO2 25 27   BUN 6* 6*   CREA 1.06 1.19   CA 8.8 9.3   AGAP 7 5   BUCR 6* 5*      CBC w/Diff Recent Labs     02/05/21  0310 02/04/21  1450   WBC 5.0 4.8   RBC 4.55* 4.78   HGB 14.1 15.3   HCT 42.3 44.2    242   GRANS 41 36*   LYMPH 49 52   EOS 3 3      Cardiac Enzymes No results for input(s): CPK, CKND1, MERCEDES in the last 72 hours. No lab exists for component: CKRMB, TROIP   Coagulation No results for input(s): PTP, INR, APTT, INREXT in the last 72 hours. Lipid Panel Lab Results   Component Value Date/Time    Cholesterol, total 217 (H) 08/28/2018 02:35 PM    HDL Cholesterol 46 08/28/2018 02:35 PM    LDL, calculated 140.4 (H) 08/28/2018 02:35 PM    VLDL, calculated 30.6 08/28/2018 02:35 PM    Triglyceride 153 (H) 08/28/2018 02:35 PM    CHOL/HDL Ratio 4.7 08/28/2018 02:35 PM      BNP No results for input(s): BNPP in the last 72 hours. Liver Enzymes No results for input(s): TP, ALB, TBIL, AP in the last 72 hours. No lab exists for component: SGOT, GPT, DBIL   Thyroid Studies Lab Results   Component Value Date/Time    TSH 1.10 08/28/2018 02:35 PM        Procedures/imaging: see electronic medical records for all procedures/Xrays and details which were not copied into this note but were reviewed prior to creation of Plan    Xr Chest Pa Lat    Result Date: 2/5/2021  HISTORY: -Provided with order: malignancy -Additional: None Technique: CHEST PA AND LATERAL VIEWS Comparison study:None. FINDINGS: HEART AND MEDIASTINUM: Unremarkable. LUNGS AND PLEURAL SPACES: No plain film evidence of consolidation, congestive heart failure, pleural effusion or pneumothorax. BONY THORAX AND SOFT TISSUES: No acute osseous abnormality.      No acute cardiopulmonary disease is identified by plain films, allowing for technique. Ct Neck Soft Tissue W Cont    Result Date: 2/4/2021  EXAM:  CT of the neck with intravenous contrast. CLINICAL INDICATION/HISTORY: Left neck pain and swelling COMPARISON: None. . TECHNIQUE: Helical axial CT soft tissue neck performed following nonionic IV contrast administration with coronal and sagittal reconstructions obtained. One or more dose reduction techniques were used on this CT: automated exposure control, adjustment of the mAs and/or kVp according to patient's size, and iterative reconstruction techniques. The specific techniques utilized on this CT exam have been documented in the patient's electronic medical record. Digital imaging and communications and medicine (DICOM) format image data are available to nonaffiliated external healthcare facilities or entities on a secure, media free, reciprocally searchable basis with patient authorization for at least a 12 month period after this study. _______________ FINDINGS:      LYMPH NODES:  There are multiple enlarged homogeneously enhancing lymph nodes in the left upper neck, 2.1 cm left level 2A, 2.1 cm just below the left parotid, 1.7 cm left level IIb and slightly more inferiorly 2 cm left level IIb. All of these measurements are in greatest transverse dimensions on axial imaging, with abnormal rounded appearance of these lymph nodes. Several small additional subcentimeter bilateral cervical chain lymph nodes. 10 mm diameter right paratracheal lymph node at the thoracic inlet. SUPERFICIAL SOFT TISSUES:  Unremarkable. ORAL CAVITY:  Unremarkable. PHARYNX:  Very mildly enlarged symmetric palatine tonsils with no focal mucosal space nodular lesion. LARYNX:  Unremarkable. PAROTID GLANDS:  Unremarkable. SUBMANDIBULAR GLANDS:  Unremarkable. THYROID GLAND:  Unremarkable. INCLUDED PARANASAL SINUSES:  Mild ethmoid sinus mucosal thickening. Mastoids and middle ear spaces clear. MIDDLE EARS:  Unremarkable. INCLUDED ORBITS:  Unremarkable. INCLUDED BRAIN:  Unremarkable. PULMONARY APICES:  Unremarkable. BONES:  Degenerative cervical spondylosis with no high-grade canal stenosis, no acute or aggressive osseous findings. _______________     1. Multiple homogeneously enhancing enlarged left upper cervical chain lymph nodes, level 2. Differential diagnosis includes both inflammatory versus neoplastic etiology lymph nodes, including metastatic disease and less likely lymphoma. No discrete mucosal lesion but suggest endoscopic exam for occult mucosal lesion. 2. Additional ancillary findings as above.        Lincoln Henley MD

## 2021-02-05 NOTE — PROGRESS NOTES
[facility] care of patient. Patient alert and oriented x4. Denies any SOB/chest pain. On room air. Last BM this AM. Tender lump noted to left side of neck. Denies any difficulty swallowing. Reports pain 5/10 to left neck. Patient sitting on edge of bed eating dinner. Call light in reach.

## 2021-02-05 NOTE — PROGRESS NOTES
2039-alert and oriented x 4. Up to bathroom independently. Lungs CTA on RA. BS active x 4 quads. IV access to left AC infusing IVF without difficulty. Patient with pain rated at 5/10 to left side of neck, but declines pain medication. Left side of neck swollen. 0443-Dr. Gentile on floor and asked about diet order for this patient. She said she would look into it. 0603-Taken to x-ray via wheel chair. 0612-returned to floor. Shift summary-ambulates in room independently. No complaints through the noc.

## 2021-02-05 NOTE — PROGRESS NOTES
Patient is alert and oriented, and able to clearly report the risks of leaving the hospital w/o further intervention. The risks of leaving the hospital AMA and benefits of further treatment discussed with patient. The patient's decisional capacity is intact and clearly understands. The patient is fully aware of the risks including, but not limited to the following: worsening symptoms, decline in functional status, and even the possiblity of death. The patient declines the recommendations at this time. The patient clearly understands that she/he may return to the ED at any time for further evaluation and treatment. The patient is adamant of leaving the hospital AMA.      Recommend to f/u with Dr. Rustam Carbajal for further work up and treatment

## 2021-02-05 NOTE — CONSULTS
Phone: [phone]  Paging : [phone]      Hematology / Oncology Progress Note    Admit Date: 2021    Assessment:     Principal Problem:    Lymphadenopathy (2021)    Active Problems:    GERD (gastroesophageal reflux disease) (2021)      Smoker (2021)      Neck mass (2021)      History of illicit drug use (5229)      History of alcohol abuse (2021)        Plan:     .  Pending tests include a serum protein electrophoresis with quantitative immunoglobulins. Suggest the followin. Chest x-ray PA and lateral now. With his tracheal note this easily could be a lung process such as lung cancer and a heavy smoker. CTs of chest abdomen pelvis are plan to complete staging. 2.  Fine-needle biopsy is likely not be enough further diagnosis. If this is a lymphoma, will need molecular studies. Would need a much larger piece of tissue than what can be obtained with a needle biopsy. If this is lung cancer now standard of care to do next generation sequencing to determine markers to help with treatment options. Is unlikely we will get enough tissue with a needle biopsy. These consider excisional biopsy of the lymph nodes in the neck for diagnosis. In the fact this patient does not have insurance a large piece of tissue now will allow us to do well the pathology as an outpatient and we can begin treatment as the diagnosis is obtained. Is likely malignant. Line 3. Final treatment recommendations will be made once we have his staging completed well as we have a tissue diagnosis. 4.  If the lymphoma, we send tissue unfixed to pathology for flow cytometry, chromosomal analysis, FISH analysis. If it is a carcinoma, send for PD-L1 and next generation sequencing with Car[facility] testing    Excisional biopsy would be preferred so we have enough tissue for markers    Subjective:   Chaparrita Wilkes is a 64-year-old man who complains of a 7 to 10-day prior to admission.   Of increasing lymphadenopathy in the left neck. This was not associated with a fever, infectious episode, or dental issue. He denies any fever chills night sweats sores chills but noticed that over the last 7 to 10 days the masses in his neck have been getting larger. They are somewhat tender. He denies any hoarseness. He has no teeth and no dental source, no tongue pain no hoarseness, no history of a viral prodrome. No B symptoms. He does have a history of heavy tobacco use having smoked a half a pack per day for 30 years with no real interest in stopping. Previously several years ago he was a heavy alcoholic. He is also used cocaine on the side as well as pot but has not had any illicit drug use in many years. Health maintenance he has not had a flu shot pneumococcal vaccine he did have a colonoscopy about 1 year ago. There was no polyps no blood. He did have chickenpox. The patient came to the emergency room due to the increasing masses in his neck. On examination he had multiple enlarged lymph nodes involving the left neck which were somewhat tender and rubbery in size. There is a conglomeration of nodes in his neck. He had a CT of the neck showing multiple enlarged homogeneously enhancing lymph  nodes in the left upper neck, 2.1 cm left level 2A, 2.1 cm just below the left parotid, 1.7 cm left level IIb and slightly more inferiorly 2 cm left level IIb. All of these measurements are in greatest transverse dimensions on axial  imaging, with abnormal rounded appearance of these lymph nodes. Several small additional subcentimeter bilateral cervical chain lymph nodes. 10 mm diameter right paratracheal lymph node at the thoracic inlet. Was admitted to the hospital for further evaluation and treatment. Laboratory studies show a white count of 5 hemoglobin 14.1 platelets 137,557.   He has a normal differential.  Serum chemistries show a sodium of 139 potassium 3.9 chloride 107 CO2 25 glucose 122 BUN 6 creatinine 1.06 calcium 8.8 magnesium 2.3. Bilirubin 0.5 total protein 8.2 albumin 4.4 ALT 37 AST 21 alkaline phosphatase 114 cholesterol 217 triglycerides 153 collated  hemoglobin A1c 5.7. . Are asked to evaluate him for his lymphadenopathy. No chest x-ray was done.     Past medical history:  History low repair 1990s  Right jaw secondary to trauma. Jaw was wired shut for 3 months and healed. Family history: Mother  of a pulmonary embolism. She was getting chemo and radiation for vulvar carcinoma she also has a history of cholesterol hypertension and diabetes. She  in the emergency room with a cardiopulmonary arrest.  At autopsy they found that she had a pulmonary emboli which was massive probably coming from the pelvis and legs and went to the lungs  Father  of natural causes. He had a history of mesothelioma and asbestosis. Not sure if he  of the mesothelioma or natural causes. 1 brother who was born with transposition of the great vessels. He is still alive and well  2 twin sons age 29. They are healthy. Social history:  The patient is . Tobacco he smokes half a pack per day for 30 years and continues to smoke  Alcohol l: Previous heavy drinker. Patient describes himself as a recovering alcoholic he has not drank for about 9 years  Abuse: The patient is a previous addict. He previously used cocaine marijuana but it has been many years since he was using drugs. Previously HIV negative. 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    Objective:     Patient Vitals for the past 24 hrs:   BP Temp Pulse Resp SpO2 Height Weight   21 0356 109/68 98.8 °F (37.1 °C) 84 16 97 %     21 2009 110/62 98.8 °F (37.1 °C) 91 18 97 %     21 1815     100 %     21 1800 124/72    95 %     21 1700 111/75    99 %     21 1632 119/80    100 %     21 1427 122/84 97.1 °F (36.2 °C) (!) 111 16 100 % 5' 10\" (1.778 m) 92.5 kg (204 lb)         Intake/Output Summary (Last 24 hours) at 2021 9255  Last data filed at 2021 0131  Gross per 24 hour   Intake    Output 475 ml   Net -475 ml       Temp (24hrs), Av.2 °F (36.8 °C), Min:97.1 °F (36.2 °C), Max:98.8 °F (37.1 °C)      Constitutional:     Fever N    Chills N    Weight Loss N    Malaise N    Fatigue N    Diaphoresis N    Weakness N     Skin:    Rash N    Itching N     HENT:     Headache N    Hearing Loss N    Tinnitus N    Ear pain N    Nosebleeds N    Congestion N    Stridor  N    Sore Throat N     Eyes:   Blurred Vision N   Double vision N   Photophobia N   Eye Pain N   Eye Discharge  N   Eye Redness N     Cardiovascular:   Chest Pain N   Palpitations N   Orthopnea N   Claudication N   PND N   Leg Swelling N     Respiratory:    Cough N   Hemoptysis N   Sputum Production N   Shortness of Breath N   Wheezing N     Gastrointestinal:    Heartburn N   Nausea N   Abdominal Pain N   Diarrhea N   Constipation N   Blood in Stool N   Melena N      Genitourinary:   Dysuria N   Urgency N   Frequency N   Hematuria N   Flank Pain N     Musculoskeletal:   Myalgias N   Neck Pain N   Back Pain N   Joint Pain N   Falls N     Endo/Heme/Allergies:   Easy Bruise N   Easy Bleed N   Env. Allergies N   Polydipsia N     Neurological:   Dizziness N   Tingling N   Tremor N   Sensory Change N   Speech Change N   Focal Weakness N   Seizures N   LOC N      Psychiatric:  Depression N   Hallucinations N   Nervous N   Anxious N   Insomnia N   Memory Loss N       Exam:  Is a well-developed male no acute distress  Head normocephalic atraumatic eyes pupils equal round reactive to light and accommodation extraocular muscles intact no jaundice was seen  There was no sinus tenderness or discharge  Oral nasal mucosa shows an edentulous male no masses or lesions noted no thrush is seen  Neck is supple.   There are multiple masses in the left neck from the supraclavicular area all the way up the anterior cervical chain. The largest is a conglomeration of lymph nodes that are matted about 2-1/2 to 3 cm in size. These are all concerning for malignancy  No oral lesions sewn the patient is edentulous  Heart regular rate and rhythm without murmurs rubs or gallops  Lungs clear to all station percussion  Back no CVA or spinal tenderness  Abdomen soft nontender normal bowel sounds no masses no hepatosplenomegaly  Extremities no edema erythema cyanosis or clubbing  Skin is significant for the lack of bleeding bruising petechiae rashes  Neurologic exam cranial nerves intact sensorimotor exam within normal limits deep tendon reflexes 1+ cervical exam normal      Recent Results (from the past 24 hour(s))   CBC WITH AUTOMATED DIFF    Collection Time: 02/04/21  2:50 PM   Result Value Ref Range    WBC 4.8 4.6 - 13.2 K/uL    RBC 4.78 4.70 - 5.50 M/uL    HGB 15.3 13.0 - 16.0 g/dL    HCT 44.2 36.0 - 48.0 %    MCV 92.5 74.0 - 97.0 FL    MCH 32.0 24.0 - 34.0 PG    MCHC 34.6 31.0 - 37.0 g/dL    RDW 12.4 11.6 - 14.5 %    PLATELET 084 925 - 102 K/uL    MPV 10.6 9.2 - 11.8 FL    NEUTROPHILS 36 (L) 40 - 73 %    LYMPHOCYTES 52 21 - 52 %    MONOCYTES 8 3 - 10 %    EOSINOPHILS 3 0 - 5 %    BASOPHILS 1 0 - 2 %    ABS. NEUTROPHILS 1.7 (L) 1.8 - 8.0 K/UL    ABS. LYMPHOCYTES 2.5 0.9 - 3.6 K/UL    ABS. MONOCYTES 0.4 0.05 - 1.2 K/UL    ABS. EOSINOPHILS 0.2 0.0 - 0.4 K/UL    ABS.  BASOPHILS 0.1 0.0 - 0.1 K/UL    DF AUTOMATED     METABOLIC PANEL, BASIC    Collection Time: 02/04/21  2:50 PM   Result Value Ref Range    Sodium 140 136 - 145 mmol/L    Potassium 3.8 3.5 - 5.5 mmol/L    Chloride 108 100 - 111 mmol/L    CO2 27 21 - 32 mmol/L    Anion gap 5 3.0 - 18 mmol/L    Glucose 112 (H) 74 - 99 mg/dL    BUN 6 (L) 7.0 - 18 MG/DL    Creatinine 1.19 0.6 - 1.3 MG/DL    BUN/Creatinine ratio 5 (L) 12 - 20      GFR est AA >60 >60 ml/min/1.73m2    GFR est non-AA >60 >60 ml/min/1.73m2    Calcium 9.3 8.5 - 10.1 MG/DL   LD    Collection Time: 02/04/21  2:50 PM   Result Value Ref Range     81 - 646 U/L   METABOLIC PANEL, BASIC    Collection Time: 02/05/21  3:10 AM   Result Value Ref Range    Sodium 139 136 - 145 mmol/L    Potassium 3.9 3.5 - 5.5 mmol/L    Chloride 107 100 - 111 mmol/L    CO2 25 21 - 32 mmol/L    Anion gap 7 3.0 - 18 mmol/L    Glucose 122 (H) 74 - 99 mg/dL    BUN 6 (L) 7.0 - 18 MG/DL    Creatinine 1.06 0.6 - 1.3 MG/DL    BUN/Creatinine ratio 6 (L) 12 - 20      GFR est AA >60 >60 ml/min/1.73m2    GFR est non-AA >60 >60 ml/min/1.73m2    Calcium 8.8 8.5 - 10.1 MG/DL   CBC WITH AUTOMATED DIFF    Collection Time: 02/05/21  3:10 AM   Result Value Ref Range    WBC 5.0 4.6 - 13.2 K/uL    RBC 4.55 (L) 4.70 - 5.50 M/uL    HGB 14.1 13.0 - 16.0 g/dL    HCT 42.3 36.0 - 48.0 %    MCV 93.0 74.0 - 97.0 FL    MCH 31.0 24.0 - 34.0 PG    MCHC 33.3 31.0 - 37.0 g/dL    RDW 12.5 11.6 - 14.5 %    PLATELET 389 844 - 848 K/uL    MPV 10.5 9.2 - 11.8 FL    NEUTROPHILS 41 40 - 73 %    LYMPHOCYTES 49 21 - 52 %    MONOCYTES 6 3 - 10 %    EOSINOPHILS 3 0 - 5 %    BASOPHILS 1 0 - 2 %    ABS. NEUTROPHILS 2.0 1.8 - 8.0 K/UL    ABS. LYMPHOCYTES 2.4 0.9 - 3.6 K/UL    ABS. MONOCYTES 0.3 0.05 - 1.2 K/UL    ABS. EOSINOPHILS 0.2 0.0 - 0.4 K/UL    ABS.  BASOPHILS 0.1 0.0 - 0.1 K/UL    DF AUTOMATED     MAGNESIUM    Collection Time: 02/05/21  3:10 AM   Result Value Ref Range    Magnesium 2.3 1.6 - 2.6 mg/dL     Current Facility-Administered Medications   Medication Dose Route Frequency Provider Last Rate Last Admin    sodium chloride (NS) flush 5-40 mL  5-40 mL IntraVENous Q8H Minus MD Bekah        sodium chloride (NS) flush 5-40 mL  5-40 mL IntraVENous PRN Minus MD Bekah        acetaminophen (TYLENOL) tablet 650 mg  650 mg Oral Q6H PRN Minus MD Bekah        Or    acetaminophen (TYLENOL) suppository 650 mg  650 mg Rectal Q6H PRN Minus MD Bekah        bisacodyL (DULCOLAX) suppository 10 mg  10 mg Rectal DAILY PRN Minus MD Bekah        promethazine (PHENERGAN) tablet 12.5 mg  12.5 mg Oral Q6H PRN Cheryl Kitchen MD        Or    ondansetron [facility]) injection 4 mg  4 mg IntraVENous Q6H PRN Cheryl Kitchen MD        famotidine (PEPCID) tablet 20 mg  20 mg Oral BID Cheryl Kitchen MD   20 mg at 02/04/21 2040    enoxaparin (LOVENOX) injection 40 mg  40 mg SubCUTAneous DAILY Cheryl Kitchen MD        nicotine (NICODERM CQ) 14 mg/24 hr patch 1 Patch  1 Patch TransDERmal DAILY Winnie Prey, MD Daryle Fowler, MD

## 2021-02-08 LAB
ALBUMIN SERPL ELPH-MCNC: 3.6 G/DL (ref 2.9–4.4)
ALBUMIN/GLOB SERPL: 1 {RATIO} (ref 0.7–1.7)
ALPHA1 GLOB SERPL ELPH-MCNC: 0.3 G/DL (ref 0–0.4)
ALPHA2 GLOB SERPL ELPH-MCNC: 1 G/DL (ref 0.4–1)
B-GLOBULIN SERPL ELPH-MCNC: 1.2 G/DL (ref 0.7–1.3)
GAMMA GLOB SERPL ELPH-MCNC: 1.3 G/DL (ref 0.4–1.8)
GLOBULIN SER CALC-MCNC: 3.7 G/DL (ref 2.2–3.9)
M PROTEIN SERPL ELPH-MCNC: NORMAL G/DL
PROT SERPL-MCNC: 7.3 G/DL (ref 6–8.5)

## 2021-02-09 NOTE — PROGRESS NOTES
Cannot do LN biopsy today because of limited path and specimen needs to be sent out and it will be to late I the day. Pathologist recommends doing procedure during week. Patient does not want to stay weekend and wants to go home today. Will set up surgery for next week. Discussed plan with patient.

## 2021-02-10 LAB
ALBUMIN SERPL ELPH-MCNC: 3.7 G/DL (ref 2.9–4.4)
ALBUMIN/GLOB SERPL: 1 {RATIO} (ref 0.7–1.7)
ALPHA1 GLOB SERPL ELPH-MCNC: 0.3 G/DL (ref 0–0.4)
ALPHA2 GLOB SERPL ELPH-MCNC: 1 G/DL (ref 0.4–1)
B-GLOBULIN SERPL ELPH-MCNC: 1.3 G/DL (ref 0.7–1.3)
GAMMA GLOB SERPL ELPH-MCNC: 1.4 G/DL (ref 0.4–1.8)
GLOBULIN SER-MCNC: 4 G/DL (ref 2.2–3.9)
IGA SERPL-MCNC: 358 MG/DL (ref 90–386)
IGG SERPL-MCNC: 1211 MG/DL (ref 603–1613)
IGM SERPL-MCNC: 144 MG/DL (ref 20–172)
INTERPRETATION SERPL IEP-IMP: ABNORMAL
KAPPA LC FREE SER-MCNC: ABNORMAL MG/L
KAPPA LC FREE/LAMBDA FREE SER: ABNORMAL {RATIO}
LAMBDA LC FREE SERPL-MCNC: ABNORMAL MG/L
M PROTEIN SERPL ELPH-MCNC: ABNORMAL G/DL
PROT SERPL-MCNC: 7.7 G/DL (ref 6–8.5)

## 2021-02-11 LAB
KAPPA LC FREE SER-MCNC: 45.9 MG/L (ref 3.3–19.4)
KAPPA LC FREE/LAMBDA FREE SER: 2.04 {RATIO} (ref 0.26–1.65)
LAMBDA LC FREE SERPL-MCNC: 22.5 MG/L (ref 5.7–26.3)